# Patient Record
(demographics unavailable — no encounter records)

---

## 2024-10-16 NOTE — DISCUSSION/SUMMARY
[FreeTextEntry1] : The patient is an 86-year-old female DM, HTN, HLD, SVT, CAD, A-fib, breast cancer s/p radiation, ESRD with symptomatic A-fib during dialysis.  #1 CV- prior Cx stent, last cath 2019 mod disease, c/w isosorbide 10/10/20mg, no angina #2 AS- s/p TAVR. c/w aspirin 81mg. #3 A-Fib- c/w eliquis 2.5mg bid., start amiodarone 200mg bid x 14 days then daily and f/u w/n month. #4 HTN- c/w amlodipine 10mg and coreg 25mg bid, hydralazine 25mg AM, midday and 2 tab PM, #5 HLD- c/w rosuvastatin 40mg #6 DM- Mediterranean diet, c/w januvia #6 CAD- c/w aspirin,c/w lasix 80mg daily #7 Breast cancer- s/p radiation, no recurrence #8 CKD- f/u Dr. Tepadino, renvela, c/w Epogen, hold hydralazine and amlodipine prior to dialysis #9 Vascular- s/p AV fistula on right, JUAN A stenosis, f/u Dr. Haddad [EKG obtained to assist in diagnosis and management of assessed problem(s)] : EKG obtained to assist in diagnosis and management of assessed problem(s)

## 2024-10-16 NOTE — HISTORY OF PRESENT ILLNESS
[FreeTextEntry1] : Bridget was in CDU last Friday directly from Dialysis. Records reviewed. Seems she went into A-fib at 150 with SOB and weakness. Vagal maneuver performed and self converted. In retrospect happened before while doing laundry. At first thought secondary to K but seems she had UTI and now on Ab. Here with son and feels better.

## 2024-10-16 NOTE — CARDIOLOGY SUMMARY
[de-identified] : 10/16/24 sinus rhythm  [de-identified] : 7/15/24 NlLV fxn, valve ok, bilateral pleural effusion

## 2024-10-21 NOTE — ASSESSMENT
[FreeTextEntry1] : Assessment:  1. L CFA disease on CTA - no claudication - no rest pain 2. TAVR 3. HTN 4. HLD 5. Carotid bruits. 6. TICO with L RA stent    Plan: 1. Continue current meds 2. Surveillance carotid duplex after Summer of 2025 3. Discussed signs and symptoms of stroke. 4. R digit wound has healed 5.  Medical management of carotid disease, discussed at length with patient and son 6.  RTC in 6 months.

## 2024-10-21 NOTE — REASON FOR VISIT
[FreeTextEntry2] : PAD/ renal artery stenosis/ carotid disease [FreeTextEntry1] : 10/21/2024   She was at [facility] for a few hours for HTN She is HTN with HD Had HD earlier today  seeing Dr. Cain today  No CVA or Stroke   8/2024 Carotid duplex showed:  The peak systolic velocity measurements on the right are in the range for a severe, greater than 70% stenosis of the right internal carotid artery.  The peak systolic velocity measurements on this examination do not support the diagnosis of a flow-limiting stenosis of the left internal carotid artery.  Plan to discuss management during the next visit. Patient has preferred medical management in the past. Discussed with patient's son.  4/15  Since last visit patient reports hospitalization for COVID and HFpEF in January.  No wounds toes are pink and perfused R PT loudly audible.  Left PT audible, monophasic bilateral cartoid bruits no CVa, tia, amaurosis  10/16/2023  Admission for hypertensive urgency in July, recent admission for Afib in early August (on Eliquis 2.5 mg BID). Renal artery duplex in July showed no definite evidence of a significant renal artery stenosis. Admitted again in August to due hypertensive urgency post dialysis accompanied by weakness.  Noted in August to have R 1st and 2nd digit wound. PAD medically managed.   LE arterial duplex 8/2023: There are diffuse atherosclerotic changes.  20-49% diameter reduction is identified in the right common femoral, deep  femoral and distal superficial femoral and popliteal arteries. There are  occlusions of the right anterior tibial artery.  There is severe stenosis of the left common femoral artery. Mild to  moderate stenoses of the distal left superficial femoral and popliteal  arteries are noted. There are occlusions of the left posterior tibial  artery.  Renal Artery Duplex 8/2023: No evidence of a significant renal artery stenosis. No significant change from prior renal duplex examination.  LE venous duplex 8/2023: No evidence of left lower extremity deep venous thrombosis.  Carotid Artery Duplex 7/2023: There are moderate, 50-69% stenoses of both the right and left internal carotid arteries.  6/19/2023 Now on HD via R AVF (placed by Dr. Wong) She has not had additional carotid eval since then - had 4 admissions Dr. Irwin is nephrology no stroke, no amaurosis, no TIA.  She is on aspirin crestor 40   12/19/2022  Creatine followed by Dr. Yancey, seeing later today Had a fall She has a right AV fistula - its matured No CVA, TIA and amaurosis Breathing is ok She had a mechanical fall Nov 3rd, fx ribs and hurt L shoulder.     6/3/2022  Had renal artery duplex  R kidney 12.5cm L kidney 11.1 cm Elevated resistive index.  No comment in report about renal artery stenosis.  I reviewed the images from [facility].  the renal stent appears to be patent She has no CP or SOB She is planning on having AV fistual creation next month   5/6/2022  Seen by Dr. Desai for L ICA stenosis.  Was planned for TCAR, however no plans for TCAR as < 80% and asymptomatic.  Her creatinine in April 22, 2022 was 5.50 Will need HD access  BP is well controlled Seeing Dr. Yancey end of the month He thinks she will need HD.  Medications: *Crestor 40 *Pantoprazole *Amlodipine 10 *Lasix 80mg  daily  *Coreg 25 BID *hydralazine 50mg TID *Januvia *Aspirin 81mg daily Plavix 75 Iron     3/21/2022 Her BP at home is 140-150 Sometimes she gets 160 Breathing is ok  Has not had MRA neck yet (had moderate ICA disease, but with some shadowing artifact) She is seeing Dr. Yancey in 2 weeks No stroke, TIA, amaurosis  No chest pain.   L renal artery stent placement at [facility] R RA very complex to fix, med management  Medications: *Crestor 40 *Pantoprazole *Amlodipine 10 *Lasix 80mg  M/W/F/S *Coreg 12.5mg BID *hydralazine 50mg TID *Januvia *Aspirin 81mg daily

## 2024-10-31 NOTE — PHYSICAL EXAM
[Normal] : heart rate was normal and rhythm regular, normal S1 and S2, no murmurs [de-identified] : small hernia  just to the left of the umbilicus

## 2024-10-31 NOTE — ASSESSMENT
[FreeTextEntry1] : I ordered a CT scan of her abdomen and referred her to surgery  Office follow-up in 4 months   I spent 31 minutes with the patient and her son and answered all of her questions

## 2024-10-31 NOTE — CARDIOLOGY SUMMARY
[de-identified] : 10/31/24 sinus rhythm  [de-identified] : 7/15/24 NlLV fxn, valve ok, bilateral pleural effusion

## 2024-10-31 NOTE — DISCUSSION/SUMMARY
[FreeTextEntry1] : The patient is an 86-year-old female DM, HTN, HLD, SVT, CAD, A-fib, breast cancer s/p radiation, ESRD with umbilical hernia.  #1 CV- prior Cx stent, last cath 2019 mod disease, c/w isosorbide 10/10/20mg, no angina #2 AS- s/p TAVR. c/w aspirin 81mg. #3 A-Fib- c/w eliquis 2.5mg bid., c/w amiodarone 100mg daily #4 HTN- c/w amlodipine 10mg, hydralazine 25mg AM, midday and 2 tab PM, #5 HLD- c/w rosuvastatin 40mg #6 DM- Mediterranean diet, c/w januvia #6 CAD- c/w aspirin ,c/w furosemide 80mg daily #7 Breast cancer- s/p radiation, no recurrence #8 CKD- f/u Dr. Tepadino, renvela, c/w Epogen, hold hydralazine and amlodipine prior to dialysis #9 Vascular- s/p AV fistula on right, JUAN A stenosis, f/u Dr. Haddad [EKG obtained to assist in diagnosis and management of assessed problem(s)] : EKG obtained to assist in diagnosis and management of assessed problem(s)

## 2024-10-31 NOTE — CONSULT LETTER
[Dear  ___] : Dear ~NICKY, [Consult Letter:] : I had the pleasure of evaluating your patient, [unfilled]. [( Thank you for referring [unfilled] for consultation for _____ )] : Thank you for referring [unfilled] for consultation for [unfilled] [Please see my note below.] : Please see my note below. [Consult Closing:] : Thank you very much for allowing me to participate in the care of this patient.  If you have any questions, please do not hesitate to contact me. [Sincerely,] : Sincerely, [FreeTextEntry3] : Dwaine Joy MD  Gastroenterology [facility]

## 2024-10-31 NOTE — HISTORY OF PRESENT ILLNESS
[FreeTextEntry1] : Bridget was diagnosed with umbilical hernia that needs repair. She gets very tired and SOB easily. Dialysis gets tired and htn near end so then takes medication and comes down.

## 2024-10-31 NOTE — HISTORY OF PRESENT ILLNESS
[FreeTextEntry1] : Her heartburn is well-controlled with pantoprazole famotidine.  She now complains of umbilical pain especially at night when she  is sleeping which wakes her up.  Her son was in the room

## 2024-10-31 NOTE — CARDIOLOGY SUMMARY
[de-identified] : 10/31/24 sinus rhythm  [de-identified] : 7/15/24 NlLV fxn, valve ok, bilateral pleural effusion

## 2024-11-18 NOTE — HISTORY OF PRESENT ILLNESS
[FreeTextEntry1] : Bridget was not sure she needed this appointment but came anyway. Feeling better since last visit. No CP, dizziness or lightheadedness during dialysis.

## 2024-11-18 NOTE — DISCUSSION/SUMMARY
[EKG obtained to assist in diagnosis and management of assessed problem(s)] : EKG obtained to assist in diagnosis and management of assessed problem(s) [FreeTextEntry1] : The patient is an 86-year-old female DM, HTN, HLD, SVT, CAD, A-fib, breast cancer s/p radiation, ESRD with umbilical hernia who is otherwise feeling well. #1 CV- prior Cx stent, last cath 2019 mod disease, c/w isosorbide 10/10/20mg, no angina #2 AS- s/p TAVR. c/w aspirin 81mg. #3 A-Fib- c/w eliquis 2.5mg bid., c/w amiodarone 100mg daily #4 HTN- c/w amlodipine 10mg, hydralazine 25mg AM, midday and 2 tab PM, #5 HLD- c/w rosuvastatin 40mg #6 DM- Mediterranean diet, c/w januvia #6 CAD- c/w aspirin ,c/w furosemide 80mg daily #7 Breast cancer- s/p radiation, no recurrence #8 CKD- f/u Dr. Lacy, renvela, c/w Epogen, hold hydralazine and amlodipine prior to dialysis #9 Vascular- s/p AV fistula on right, JUAN A stenosis, f/u Dr. Haddad

## 2024-11-19 NOTE — PHYSICAL EXAM
[Normal Breath Sounds] : Normal breath sounds [Normal Rate and Rhythm] : normal rate and rhythm [No Rash or Lesion] : No rash or lesion [Alert] : alert [Calm] : calm [de-identified] : nad [de-identified] : Soft, nontender, well-healed lower midline incision.  No abdominal wall hernias [de-identified] : nl

## 2024-11-19 NOTE — HISTORY OF PRESENT ILLNESS
[de-identified] : Bridget is an 87 y/o female being seen for consultation, umbilical hernia  Patient felt pain in her umbilical area for a couple of months.   It does not make any gurgling sounds.    Regular BMs once daily or every other day.  No nausea or vomiting.  Denies fever or chills.  Patient is on aspirin and Eliquis for stentx1.

## 2024-11-19 NOTE — ASSESSMENT
[FreeTextEntry1] : In summary the patient has intermittent left-sided abdominal discomfort mainly at night.  The pain is sharp and short-lived.  She denies nausea vomiting abdominal pain or fever.  There is no abdominal wall hernia palpable or on CT performed 2 days ago.  I reassured her that there is no indication for surgery.  Her pain is likely musculoskeletal.

## 2024-11-19 NOTE — PHYSICAL EXAM
[Normal Breath Sounds] : Normal breath sounds [Normal Rate and Rhythm] : normal rate and rhythm [No Rash or Lesion] : No rash or lesion [Alert] : alert [Calm] : calm [de-identified] : nad [de-identified] : Soft, nontender, well-healed lower midline incision.  No abdominal wall hernias [de-identified] : nl

## 2024-11-19 NOTE — CONSULT LETTER
[Dear  ___] : Dear  [unfilled], [Consult Letter:] : I had the pleasure of evaluating your patient, [unfilled]. [Please see my note below.] : Please see my note below. [Consult Closing:] : Thank you very much for allowing me to participate in the care of this patient.  If you have any questions, please do not hesitate to contact me. [Sincerely,] : Sincerely, [FreeTextEntry3] : Cem Figueroa M.D., F.A.C.S, F.A.S.C.R.S [[facility]  ___] : Dr. LINDSEY

## 2024-11-19 NOTE — HISTORY OF PRESENT ILLNESS
[de-identified] : Bridget is an 87 y/o female being seen for consultation, umbilical hernia  Patient felt pain in her umbilical area for a couple of months.   It does not make any gurgling sounds.    Regular BMs once daily or every other day.  No nausea or vomiting.  Denies fever or chills.  Patient is on aspirin and Eliquis for stentx1.

## 2024-12-12 NOTE — HISTORY OF PRESENT ILLNESS
[FreeTextEntry1] : Bridget is feeling fatigued and dizzy. She does not take meds on dialysis but after comes home -200. Then takes medication. Dizziness is only today and yesterday.

## 2024-12-12 NOTE — DISCUSSION/SUMMARY
[FreeTextEntry1] : The patient is an 86-year-old female DM, HTN, HLD, SVT, CAD, A-fib, breast cancer s/p radiation, ESRD with fatigue and dizziness. #1 CV- prior Cx stent, last cath 2019 mod disease, c/w isosorbide 10/10/20mg, no angina #2 AS- s/p TAVR. c/w aspirin 81mg. #3 A-Fib- c/w Eliquis 2.5mg bid., c/w amiodarone 100mg daily #4 HTN- c/w amlodipine 10mg, hydralazine 25mg AM, midday and 2 tab PM, #5 HLD- c/w rosuvastatin 40mg #6 DM- Mediterranean diet, c/w Januvia #6 CAD- c/w aspirin, c/w furosemide 80mg daily #7 Breast cancer- s/p radiation, no recurrence #8 CKD- f/u Dr. Lacy, renvela, c/w Epogen, hold hydralazine and amlodipine prior to dialysis #9 Vascular- s/p AV fistula on right, JUAN A stenosis, f/u Dr. Haddad, no hernia just pulled muscle. [EKG obtained to assist in diagnosis and management of assessed problem(s)] : EKG obtained to assist in diagnosis and management of assessed problem(s)

## 2024-12-17 NOTE — HISTORY OF PRESENT ILLNESS
[FreeTextEntry1] : Pt is an 85 y/o female c/o right hand numbness and tingling x 6-7 months.  It wakes her from sleep even while wearing a wrist support splint.  She had 2 cortisone injections by a neurologist which both helped for a short time.  She was told that she cannot have another injection.  She was referred here for surgical consultation. Of note, patient had dialysis on right hand x 1.5 years.

## 2024-12-17 NOTE — DISCUSSION/SUMMARY
[FreeTextEntry1] : The underlying pathophysiology was reviewed with the patient. XR films were reviewed with the patient. Discussed at length the nature of the patient's condition. Her right-hand symptoms appear secondary to CTS. Discussed options for treatment including wrist brace, cortisone injection, and surgery. Risks and benefits of surgery discussed with patient. Advised patient to consult with surgical scheduler for CTR.   She should follow up post op.

## 2024-12-17 NOTE — PHYSICAL EXAM
[de-identified] : Patient is WDWN, alert, and in no acute distress. Breathing is unlabored. She is grossly oriented to person, place and time.  Right Wrist: No tenderness, edema, or deformities. No thenar atrophy. Full ROM with decreased sensation along median nerve distribution.  Test/Signs: Tinel's sing is negative over carpel tunnel, Phalen's test is positive.     [de-identified] : No imaging done today.

## 2024-12-17 NOTE — HISTORY OF PRESENT ILLNESS
[FreeTextEntry1] : Pt is an 87 y/o female c/o right hand numbness and tingling x 6-7 months.  It wakes her from sleep even while wearing a wrist support splint.  She had 2 cortisone injections by a neurologist which both helped for a short time.  She was told that she cannot have another injection.  She was referred here for surgical consultation. Of note, patient had dialysis on right hand x 1.5 years.

## 2024-12-17 NOTE — PHYSICAL EXAM
[de-identified] : Patient is WDWN, alert, and in no acute distress. Breathing is unlabored. She is grossly oriented to person, place and time.  Right Wrist: No tenderness, edema, or deformities. No thenar atrophy. Full ROM with decreased sensation along median nerve distribution.  Test/Signs: Tinel's sing is negative over carpel tunnel, Phalen's test is positive.     [de-identified] : No imaging done today.

## 2024-12-17 NOTE — PHYSICAL EXAM
[de-identified] : Patient is WDWN, alert, and in no acute distress. Breathing is unlabored. She is grossly oriented to person, place and time.  Right Wrist: No tenderness, edema, or deformities. No thenar atrophy. Full ROM with decreased sensation along median nerve distribution.  Test/Signs: Tinel's sing is negative over carpel tunnel, Phalen's test is positive.     [de-identified] : No imaging done today.

## 2024-12-17 NOTE — ADDENDUM
[FreeTextEntry1] : This note was written by Dewayne Mendoza on 12/16/2024 actively solely KYA Rob M.D.     All medical record entries made by the Scribe were at my, KYA Rob M.D. direction and personally dictated by me on 12/16/2024. I have personally reviewed the chart and agree that the record reflects my personal performance of the history, physical exam, assessment, and plan.

## 2024-12-20 NOTE — PLAN
[FreeTextEntry1] : Dizziness/ h/o Carotid occlusive disease US carotid doppler  vascular folow up  CAD/ Htn/ A.fib/ CHF / Htn EKG: Sinus major Spoke w pt cardiologist Dr. Cain> will decrease Amio to 100 mg QD decrease  Amlodipine to 100mg QD Decrease carvedilol to 6.25 mg Q pm / cont Carvedilol 12.5 mg in am  cont [id number] mg TID cont Hydralazine 25 mg morning, 25 mg noon, and 50 mg night QD-- Pt instructed to take additional 25 mg Hydrolazine if BP continues to be high cont Eliquis 2.5 mg BID cont ASA 81 mg QD cont Rosuvastatin 40 mg QD cont Furosemide 80 mg QD cardiology f/u next week   DM II Dietary counseling given, dietary avoidance discussed, diet and exercise reviewed with patient; patient reminded of importance of aerobic exercise, weight control, dietary compliance and regular glucose monitoring. Need low carb diet/ exercise. cont Januvia 25 mg QD.  GERD GI f/u cont pantoprazole 40 mg QD  ESRD on HD  nephrology folow up  Rt carpal tunnel awaiting surgery Continue sling

## 2024-12-20 NOTE — HISTORY OF PRESENT ILLNESS
[de-identified] : Ms. BHARATHI HILL is an 86-year-old female with hx. of HTN/ AS s/p TAVR/ CAD w stent/ HLD/ DM/ A.fib/breast cancers/p partial mastectomy/ RT/ ESRD on HD since 1/2023 RT arm fistula on HD M/W/ FR, dialysis, who presents for a follow up. Patient following with hand specialist for carpal tunnel and awaiting right carpal tunnel surgery. She also complaining intermittent dizziness and fatigue.  Patient has history of left carotid occlusive disease.  Last carotid Doppler more than a year ago.  She denies any chest pain, shortness of breath, abdominal pain.

## 2024-12-20 NOTE — PLAN
[FreeTextEntry1] : Dizziness/ h/o Carotid occlusive disease US carotid doppler  vascular folow up  CAD/ Htn/ A.fib/ CHF / Htn EKG: Sinus major Spoke w pt cardiologist Dr. Cain> will decrease Amio to 100 mg QD decrease  Amlodipine to 100mg QD Decrease carvedilol to 6.25 mg Q pm / cont Carvedilol 12.5 mg in am  cont [id number] mg TID cont Hydralazine 25 mg morning, 25 mg noon, and 50 mg night QD-- Pt instructed to take additional 25 mg Hydrolazine if BP continues to be high cont Eliquis 2.5 mg BID cont ASA 81 mg QD cont Rosuvastatin 40 mg QD cont Furosemide 80 mg QD cardiology f/u next week   DM II Dietary counseling given, dietary avoidance discussed, diet and exercise reviewed with patient; patient reminded of importance of aerobic exercise, weight control, dietary compliance and regular glucose monitoring. Need low carb diet/ exercise. cont Januvia 25 mg QD.  GERD GI f/u cont pantoprazole 40 mg QD  ESRD on HD  nephrology folow up  Rt carpal tunnel awaiting surgery Continue sling

## 2024-12-20 NOTE — PHYSICAL EXAM
[No Acute Distress] : no acute distress [Well Nourished] : well nourished [Well Developed] : well developed [Well-Appearing] : well-appearing [Normal Sclera/Conjunctiva] : normal sclera/conjunctiva [PERRL] : pupils equal round and reactive to light [EOMI] : extraocular movements intact [Normal Outer Ear/Nose] : the outer ears and nose were normal in appearance [Normal Oropharynx] : the oropharynx was normal [No JVD] : no jugular venous distention [No Lymphadenopathy] : no lymphadenopathy [Supple] : supple [Thyroid Normal, No Nodules] : the thyroid was normal and there were no nodules present [No Respiratory Distress] : no respiratory distress  [No Accessory Muscle Use] : no accessory muscle use [Clear to Auscultation] : lungs were clear to auscultation bilaterally [Regular Rhythm] : with a regular rhythm [Normal S1, S2] : normal S1 and S2 [No Carotid Bruits] : no carotid bruits [No Abdominal Bruit] : a ~M bruit was not heard ~T in the abdomen [No Varicosities] : no varicosities [Pedal Pulses Present] : the pedal pulses are present [No Edema] : there was no peripheral edema [No Palpable Aorta] : no palpable aorta [No Extremity Clubbing/Cyanosis] : no extremity clubbing/cyanosis [Soft] : abdomen soft [Non Tender] : non-tender [Non-distended] : non-distended [Normal Bowel Sounds] : normal bowel sounds [Normal Posterior Cervical Nodes] : no posterior cervical lymphadenopathy [Normal Anterior Cervical Nodes] : no anterior cervical lymphadenopathy [No CVA Tenderness] : no CVA  tenderness [No Spinal Tenderness] : no spinal tenderness [No Joint Swelling] : no joint swelling [Grossly Normal Strength/Tone] : grossly normal strength/tone [No Rash] : no rash [No Focal Deficits] : no focal deficits [Normal Gait] : normal gait [Normal Affect] : the affect was normal [Normal Insight/Judgement] : insight and judgment were intact [de-identified] : + bradycardia

## 2024-12-20 NOTE — HISTORY OF PRESENT ILLNESS
[de-identified] : Ms. BHARATHI HILL is an 86-year-old female with hx. of HTN/ AS s/p TAVR/ CAD w stent/ HLD/ DM/ A.fib/breast cancers/p partial mastectomy/ RT/ ESRD on HD since 1/2023 RT arm fistula on HD M/W/ FR, dialysis, who presents for a follow up. Patient following with hand specialist for carpal tunnel and awaiting right carpal tunnel surgery. She also complaining intermittent dizziness and fatigue.  Patient has history of left carotid occlusive disease.  Last carotid Doppler more than a year ago.  She denies any chest pain, shortness of breath, abdominal pain.

## 2024-12-20 NOTE — PHYSICAL EXAM
[No Acute Distress] : no acute distress [Well Nourished] : well nourished [Well Developed] : well developed [Well-Appearing] : well-appearing [Normal Sclera/Conjunctiva] : normal sclera/conjunctiva [PERRL] : pupils equal round and reactive to light [EOMI] : extraocular movements intact [Normal Outer Ear/Nose] : the outer ears and nose were normal in appearance [Normal Oropharynx] : the oropharynx was normal [No JVD] : no jugular venous distention [No Lymphadenopathy] : no lymphadenopathy [Supple] : supple [Thyroid Normal, No Nodules] : the thyroid was normal and there were no nodules present [No Respiratory Distress] : no respiratory distress  [No Accessory Muscle Use] : no accessory muscle use [Clear to Auscultation] : lungs were clear to auscultation bilaterally [Regular Rhythm] : with a regular rhythm [Normal S1, S2] : normal S1 and S2 [No Carotid Bruits] : no carotid bruits [No Abdominal Bruit] : a ~M bruit was not heard ~T in the abdomen [No Varicosities] : no varicosities [Pedal Pulses Present] : the pedal pulses are present [No Edema] : there was no peripheral edema [No Palpable Aorta] : no palpable aorta [No Extremity Clubbing/Cyanosis] : no extremity clubbing/cyanosis [Soft] : abdomen soft [Non Tender] : non-tender [Non-distended] : non-distended [Normal Bowel Sounds] : normal bowel sounds [Normal Posterior Cervical Nodes] : no posterior cervical lymphadenopathy [Normal Anterior Cervical Nodes] : no anterior cervical lymphadenopathy [No CVA Tenderness] : no CVA  tenderness [No Spinal Tenderness] : no spinal tenderness [No Joint Swelling] : no joint swelling [Grossly Normal Strength/Tone] : grossly normal strength/tone [No Rash] : no rash [No Focal Deficits] : no focal deficits [Normal Gait] : normal gait [Normal Affect] : the affect was normal [Normal Insight/Judgement] : insight and judgment were intact [de-identified] : + bradycardia

## 2025-01-12 NOTE — PHYSICAL EXAM
[No Acute Distress] : no acute distress [Well Nourished] : well nourished [Well Developed] : well developed [Well-Appearing] : well-appearing [Normal Sclera/Conjunctiva] : normal sclera/conjunctiva [PERRL] : pupils equal round and reactive to light [EOMI] : extraocular movements intact [Normal Outer Ear/Nose] : the outer ears and nose were normal in appearance [Normal TMs] : both tympanic membranes were normal [No JVD] : no jugular venous distention [No Lymphadenopathy] : no lymphadenopathy [Supple] : supple [Thyroid Normal, No Nodules] : the thyroid was normal and there were no nodules present [No Respiratory Distress] : no respiratory distress  [No Accessory Muscle Use] : no accessory muscle use [Clear to Auscultation] : lungs were clear to auscultation bilaterally [Normal Rate] : normal rate  [Regular Rhythm] : with a regular rhythm [Normal S1, S2] : normal S1 and S2 [No Carotid Bruits] : no carotid bruits [Pedal Pulses Present] : the pedal pulses are present [No Edema] : there was no peripheral edema [Soft] : abdomen soft [Non Tender] : non-tender [Non-distended] : non-distended [Normal Bowel Sounds] : normal bowel sounds [Normal Posterior Cervical Nodes] : no posterior cervical lymphadenopathy [Normal Anterior Cervical Nodes] : no anterior cervical lymphadenopathy [No CVA Tenderness] : no CVA  tenderness [No Spinal Tenderness] : no spinal tenderness [No Joint Swelling] : no joint swelling [Grossly Normal Strength/Tone] : grossly normal strength/tone [No Rash] : no rash [No Focal Deficits] : no focal deficits [Normal Gait] : normal gait [Normal Affect] : the affect was normal [Normal Insight/Judgement] : insight and judgment were intact [de-identified] : + throat erythema

## 2025-01-12 NOTE — PHYSICAL EXAM
[No Acute Distress] : no acute distress [Well Nourished] : well nourished [Well Developed] : well developed [Well-Appearing] : well-appearing [Normal Sclera/Conjunctiva] : normal sclera/conjunctiva [PERRL] : pupils equal round and reactive to light [EOMI] : extraocular movements intact [Normal Outer Ear/Nose] : the outer ears and nose were normal in appearance [Normal TMs] : both tympanic membranes were normal [No JVD] : no jugular venous distention [No Lymphadenopathy] : no lymphadenopathy [Supple] : supple [Thyroid Normal, No Nodules] : the thyroid was normal and there were no nodules present [No Respiratory Distress] : no respiratory distress  [No Accessory Muscle Use] : no accessory muscle use [Clear to Auscultation] : lungs were clear to auscultation bilaterally [Normal Rate] : normal rate  [Regular Rhythm] : with a regular rhythm [Normal S1, S2] : normal S1 and S2 [No Carotid Bruits] : no carotid bruits [Pedal Pulses Present] : the pedal pulses are present [No Edema] : there was no peripheral edema [Soft] : abdomen soft [Non Tender] : non-tender [Non-distended] : non-distended [Normal Bowel Sounds] : normal bowel sounds [Normal Posterior Cervical Nodes] : no posterior cervical lymphadenopathy [Normal Anterior Cervical Nodes] : no anterior cervical lymphadenopathy [No CVA Tenderness] : no CVA  tenderness [No Spinal Tenderness] : no spinal tenderness [No Joint Swelling] : no joint swelling [Grossly Normal Strength/Tone] : grossly normal strength/tone [No Rash] : no rash [No Focal Deficits] : no focal deficits [Normal Gait] : normal gait [Normal Affect] : the affect was normal [Normal Insight/Judgement] : insight and judgment were intact [de-identified] : + throat erythema

## 2025-01-12 NOTE — HISTORY OF PRESENT ILLNESS
[de-identified] : Ms. BHARATHI HILL is an 86-year-old female, ambulates with a cane, accompanied by her son, with hx. of HTN/ AS s/p TAVR/ CAD w stent/ HLD/ DM/ A.fib/breast cancers/p partial mastectomy/ RT/ ESRD on HD since 1/2023 RT arm fistula on HD M/W/ FR, dialysis, who presents for an acute visit.   Pt. that on Sunday, 01/05/2024, she began to feel ill. C/o HA, runny nose, sneezing, head congestion, dry cough, sore throat, eye redness/irritation and tearing. Has tried using steam to decongest herself which did help. Has been taking Tylenol, Flonase and loratadine although she hasn't noticed much improvement. Denies N/V/D, fever, or chills.

## 2025-01-12 NOTE — REVIEW OF SYSTEMS
[Earache] : no earache [Nasal Discharge] : nasal discharge [Sore Throat] : sore throat [Shortness Of Breath] : no shortness of breath [Cough] : cough [Negative] : Heme/Lymph

## 2025-01-12 NOTE — HISTORY OF PRESENT ILLNESS
[de-identified] : Ms. BHARATHI HILL is an 86-year-old female, ambulates with a cane, accompanied by her son, with hx. of HTN/ AS s/p TAVR/ CAD w stent/ HLD/ DM/ A.fib/breast cancers/p partial mastectomy/ RT/ ESRD on HD since 1/2023 RT arm fistula on HD M/W/ FR, dialysis, who presents for an acute visit.   Pt. that on Sunday, 01/05/2024, she began to feel ill. C/o HA, runny nose, sneezing, head congestion, dry cough, sore throat, eye redness/irritation and tearing. Has tried using steam to decongest herself which did help. Has been taking Tylenol, Flonase and loratadine although she hasn't noticed much improvement. Denies N/V/D, fever, or chills.

## 2025-01-12 NOTE — PLAN
[FreeTextEntry1] : see plan   CAD/ Htn/ A.fib/ CHF / Htn EKG: Sinus major Spoke w pt cardiologist Dr. Cain> will decrease Amio to 100 mg QD  carvedilol to 6.25 mg Q pm / cont Carvedilol 12.5 mg in am  cont [id number] mg TID cont Hydralazine 25 mg morning, 25 mg noon, and 50 mg night QD-- Pt instructed to take additional 25 mg Hydralazine if BP continues to be high cont Eliquis 2.5 mg BID cont ASA 81 mg QD cont Rosuvastatin 40 mg QD cont Furosemide 80 mg QD cardiology f/u next week   DM II Dietary counseling given, dietary avoidance discussed, diet and exercise reviewed with patient; patient reminded of importance of aerobic exercise, weight control, dietary compliance and regular glucose monitoring. Need low carb diet/ exercise. cont Januvia 25 mg QD.  GERD GI f/u cont pantoprazole 40 mg QD  ESRD on HD  nephrology folow up  Rt carpal tunnel awaiting surgery Continue sling

## 2025-01-12 NOTE — PLAN
[FreeTextEntry1] : see plan   CAD/ Htn/ A.fib/ CHF / Htn EKG: Sinus major Spoke w pt cardiologist Dr. Cain> will decrease Amio to 100 mg QD  carvedilol to 6.25 mg Q pm / cont Carvedilol 12.5 mg in am  cont [id number] mg TID cont Hydralazine 25 mg morning, 25 mg noon, and 50 mg night QD-- Pt instructed to take additional 25 mg Hydralazine if BP continues to be high cont Eliquis 2.5 mg BID cont ASA 81 mg QD cont Rosuvastatin 40 mg QD cont Furosemide 80 mg QD cardiology f/u next week   DM II Dietary counseling given, dietary avoidance discussed, diet and exercise reviewed with patient; patient reminded of importance of aerobic exercise, weight control, dietary compliance and regular glucose monitoring. Need low carb diet/ exercise. cont Januvia 25 mg QD.  GERD GI f/u cont pantoprazole 40 mg QD  ESRD on HD  nephrology folow up  Rt carpal tunnel awaiting surgery Continue sling

## 2025-01-27 NOTE — HISTORY OF PRESENT ILLNESS
[FreeTextEntry1] : Bridget has been having elevated BP for the last two weeks. 180-200 after dialysis. Spoke to Dr. Irwin and increase hydralazine to 25/25/50.   Surgery: right carpal tunnel release  Date: 1/31/25 Surgeon: Dr. Kj Barajas

## 2025-01-27 NOTE — DISCUSSION/SUMMARY
[FreeTextEntry1] : The patient is an 86-year-old female DM, HTN, HLD, SVT, CAD, A-fib, breast cancer s/p radiation, ESRD with elevated BP awaiting surgery. #1 CV- prior Cx stent, last cath 2019 mod disease, c/w isosorbide 10/10/20mg, no angina #2 AS- s/p TAVR. c/w aspirin 81mg. #3 A-Fib- c/w Eliquis 2.5mg bid., c/w amiodarone 100mg daily #4 HTN- c/w amlodipine 10mg, hydralazine 50mg AM, midday and 2 tab PM, #5 HLD- c/w rosuvastatin 40mg #6 DM- Mediterranean diet, c/w Januvia #6 CAD- c/w aspirin, c/w furosemide 80mg daily #7 Breast cancer- s/p radiation, no recurrence #8 CKD- f/u diana Cheryvela, c/w Epogen, hold hydralazine and amlodipine prior to dialysis #9 Vascular- s/p AV fistula on right, JUAN A stenosis, f/u Dr. Haddad There are no cardiac contraindications to carpal tunnel release off Eliquis two days before.  [EKG obtained to assist in diagnosis and management of assessed problem(s)] : EKG obtained to assist in diagnosis and management of assessed problem(s)

## 2025-01-29 NOTE — PHYSICAL EXAM
[No Acute Distress] : no acute distress [Well Nourished] : well nourished [Well Developed] : well developed [Well-Appearing] : well-appearing [Normal Sclera/Conjunctiva] : normal sclera/conjunctiva [PERRL] : pupils equal round and reactive to light [EOMI] : extraocular movements intact [Normal Outer Ear/Nose] : the outer ears and nose were normal in appearance [Normal Oropharynx] : the oropharynx was normal [Normal TMs] : both tympanic membranes were normal [No JVD] : no jugular venous distention [No Lymphadenopathy] : no lymphadenopathy [Supple] : supple [Thyroid Normal, No Nodules] : the thyroid was normal and there were no nodules present [No Respiratory Distress] : no respiratory distress  [No Accessory Muscle Use] : no accessory muscle use [Clear to Auscultation] : lungs were clear to auscultation bilaterally [Normal Rate] : normal rate  [Regular Rhythm] : with a regular rhythm [Normal S1, S2] : normal S1 and S2 [No Carotid Bruits] : no carotid bruits [Pedal Pulses Present] : the pedal pulses are present [No Edema] : there was no peripheral edema [Soft] : abdomen soft [Non Tender] : non-tender [Non-distended] : non-distended [Normal Bowel Sounds] : normal bowel sounds [Normal Posterior Cervical Nodes] : no posterior cervical lymphadenopathy [Normal Anterior Cervical Nodes] : no anterior cervical lymphadenopathy [No CVA Tenderness] : no CVA  tenderness [No Spinal Tenderness] : no spinal tenderness [No Joint Swelling] : no joint swelling [Grossly Normal Strength/Tone] : grossly normal strength/tone [No Rash] : no rash [No Focal Deficits] : no focal deficits [Normal Affect] : the affect was normal [Normal Insight/Judgement] : insight and judgment were intact [de-identified] : + ambulates with cane

## 2025-01-29 NOTE — PLAN
[FreeTextEntry1] : Ms. BHARATHI MANRIQUE is a 86 year, female, with Hx of HTN/ AS s/p TAVR/ CAD w stent/ HLD/ DM/ A.fib/breast cancers/partial mastectomy/ RT/ ESRD on HD since 1/2023 RT arm fistula on HD M/W/ FR, dialysis, medically stable for procedure.  EKG: Sinus Rhythm - First Degree AV block, 63 bpm,    ESRD on HD Follows with Nephrology   CAD/ HTN/ A.fib/ CHF  cont Amlodipine 10mg  cont Amiodarone to 100 mg QD cont Carvedilol 12.5 mg  cont Isosorbide 10 mg TID increase Hydralazine  to 50mg TID  Pt instructed to take additional 25 mg Hydralazine if BP continues to be high on  Eliquis 2.5 mg BID> Hold Eliquis 3 days prior to procedure cont ASA 81 mg QD cont Rosuvastatin 40 mg QD cont Furosemide 80 mg QD    DM II hold  Januvia 3 days prior to procedure  GERD cont pantoprazole 40 mg QD  Patient was seen and cleared by cardiology.  See cardiology clearance recommendation Labs from hemodialysis from January 29 reviewed, stable

## 2025-01-29 NOTE — HISTORY OF PRESENT ILLNESS
[Aortic Stenosis] : aortic stenosis [Atrial Fibrillation] : atrial fibrillation [Coronary Artery Disease] : coronary artery disease [No Pertinent Pulmonary History] : no history of asthma, COPD, sleep apnea, or smoking [Chronic Kidney Disease] : chronic kidney disease [Diabetes] : diabetes [Recent Myocardial Infarction] : no recent myocardial infarction [FreeTextEntry1] : RT carpal tunnel release  [FreeTextEntry2] : 1/31/25 [FreeTextEntry3] : Dr. Barajas [FreeTextEntry4] : Ms. BHARATHI HILL is a 86 year old female with Hx of HTN/ AS s/p TAVR/ CAD w stent/ HLD/ DM/ A.fib/breast cancers/partial mastectomy/ RT/ ESRD on HD since 1/2023 RT arm fistula on HD M/W/ FR, dialysis, presenting for medical clearance prior to  Carpal Tunnel Surgery on her right hand (1/31/2025). She is accompanied by her son.    Pt states she is feeling well. She received Hemodialysis today and took an extra 50mg Hydralazine for elevated BP. Her son reports her BP has been elevated for the past few days. Pt reports she was compliant with all HTN medication this morning.   Denies any SOB, CP, abdominal pain, N/V/D, headache, dizziness, or leg swelling.   Reports compliance with taking her meds daily.

## 2025-01-29 NOTE — PHYSICAL EXAM
[No Acute Distress] : no acute distress [Well Nourished] : well nourished [Well Developed] : well developed [Well-Appearing] : well-appearing [Normal Sclera/Conjunctiva] : normal sclera/conjunctiva [PERRL] : pupils equal round and reactive to light [EOMI] : extraocular movements intact [Normal Outer Ear/Nose] : the outer ears and nose were normal in appearance [Normal Oropharynx] : the oropharynx was normal [Normal TMs] : both tympanic membranes were normal [No JVD] : no jugular venous distention [No Lymphadenopathy] : no lymphadenopathy [Supple] : supple [Thyroid Normal, No Nodules] : the thyroid was normal and there were no nodules present [No Respiratory Distress] : no respiratory distress  [No Accessory Muscle Use] : no accessory muscle use [Clear to Auscultation] : lungs were clear to auscultation bilaterally [Normal Rate] : normal rate  [Regular Rhythm] : with a regular rhythm [Normal S1, S2] : normal S1 and S2 [No Carotid Bruits] : no carotid bruits [Pedal Pulses Present] : the pedal pulses are present [No Edema] : there was no peripheral edema [Soft] : abdomen soft [Non Tender] : non-tender [Non-distended] : non-distended [Normal Bowel Sounds] : normal bowel sounds [Normal Posterior Cervical Nodes] : no posterior cervical lymphadenopathy [Normal Anterior Cervical Nodes] : no anterior cervical lymphadenopathy [No CVA Tenderness] : no CVA  tenderness [No Spinal Tenderness] : no spinal tenderness [No Joint Swelling] : no joint swelling [Grossly Normal Strength/Tone] : grossly normal strength/tone [No Rash] : no rash [No Focal Deficits] : no focal deficits [Normal Affect] : the affect was normal [Normal Insight/Judgement] : insight and judgment were intact [de-identified] : + ambulates with cane

## 2025-01-29 NOTE — PHYSICAL EXAM
[No Acute Distress] : no acute distress [Well Nourished] : well nourished [Well Developed] : well developed [Well-Appearing] : well-appearing [Normal Sclera/Conjunctiva] : normal sclera/conjunctiva [PERRL] : pupils equal round and reactive to light [EOMI] : extraocular movements intact [Normal Outer Ear/Nose] : the outer ears and nose were normal in appearance [Normal Oropharynx] : the oropharynx was normal [Normal TMs] : both tympanic membranes were normal [No JVD] : no jugular venous distention [No Lymphadenopathy] : no lymphadenopathy [Supple] : supple [Thyroid Normal, No Nodules] : the thyroid was normal and there were no nodules present [No Respiratory Distress] : no respiratory distress  [No Accessory Muscle Use] : no accessory muscle use [Clear to Auscultation] : lungs were clear to auscultation bilaterally [Normal Rate] : normal rate  [Regular Rhythm] : with a regular rhythm [Normal S1, S2] : normal S1 and S2 [No Carotid Bruits] : no carotid bruits [Pedal Pulses Present] : the pedal pulses are present [No Edema] : there was no peripheral edema [Soft] : abdomen soft [Non Tender] : non-tender [Non-distended] : non-distended [Normal Bowel Sounds] : normal bowel sounds [Normal Posterior Cervical Nodes] : no posterior cervical lymphadenopathy [Normal Anterior Cervical Nodes] : no anterior cervical lymphadenopathy [No CVA Tenderness] : no CVA  tenderness [No Spinal Tenderness] : no spinal tenderness [No Joint Swelling] : no joint swelling [Grossly Normal Strength/Tone] : grossly normal strength/tone [No Rash] : no rash [No Focal Deficits] : no focal deficits [Normal Affect] : the affect was normal [Normal Insight/Judgement] : insight and judgment were intact [de-identified] : + ambulates with cane

## 2025-01-29 NOTE — ADDENDUM
[FreeTextEntry1] : Documented by Radha Campoverde acting as a scribe for Dr. Stephen. 01/27/2025   All medical record entries made by the scribe were at my, Dr. Stephen, direction and personally dictated by me on 01/27/2025. I have reviewed the chart an agree that the record accurately reflects my personal performance of the history, physical exam, assessment and plan. I have also personally directed, reviewed, and agreed with the chart.

## 2025-02-06 NOTE — DISCUSSION/SUMMARY
[FreeTextEntry1] : The patient is an 86-year-old female DM, HTN, HLD, SVT, CAD, A-fib, breast cancer s/p radiation, ESRD with elevated BP #1 CV- prior Cx stent, last cath 2019 mod disease, c/w isosorbide 10/10/20mg, no angina #2 AS- s/p TAVR. c/w aspirin 81mg, ECHO ordered #3 A-Fib- c/w Eliquis 2.5mg bid., c/w amiodarone 100mg daily #4 HTN- c/w amlodipine 10mg, hydralazine 50mg now 3x day #5 HLD- c/w rosuvastatin 40mg #6 DM- Mediterranean diet, c/w Januvia #6 CAD- c/w aspirin, c/w furosemide 80mg daily #7 Breast cancer- s/p radiation, no recurrence #8 CKD- f/u Dr. Tepadino, Renvela, c/w Epogen, not holding hydralazine and amlodipine prior to dialysis #9 Vascular- s/p AV fistula on right, JUAN A stenosis, f/u Dr. Haddad #10 Ortho- s/p carpal tunnel release

## 2025-02-06 NOTE — HISTORY OF PRESENT ILLNESS
[FreeTextEntry1] : Bridget had her carpal tunnel surgery but now with soreness. BP still high and now taking medication before dialysis.

## 2025-02-10 NOTE — ADDENDUM
[FreeTextEntry1] : I, Ayala Han, wrote this note acting as a scribe for Dr. Kj Barajas M.D. on 02/10/2025.   All medical record entries made by the Scribe were at my, Dr. Kj Barajas M.D., direction and personally dictated by me on 02/10/2025. I have personally reviewed the chart and agree that the record accurately reflects my personal performance of the history, physical exam, assessment, and plan.

## 2025-02-10 NOTE — PHYSICAL EXAM
[de-identified] : Patient is WDWN, alert, and in no acute distress. Breathing is unlabored. She is grossly oriented to person, place, and time.   Right Wrist:  incision is healing well. There are no signs of infection.  Sutures were removed. Normal amount of post-operative edema and tenderness present.  [de-identified] : No imaging done today.

## 2025-02-10 NOTE — HISTORY OF PRESENT ILLNESS
[FreeTextEntry1] : Pt is an 87 y/o female who presents today for a 1st post-operative visit s/p right carpal tunnel release. DOS: 01/31/25. Patient reports some symptomatic improvement in her numbness and nighttime symptoms post-operatively. Postoperative pain is well controlled. She denies any fevers, chills, or night sweats. Fingers are moving well.

## 2025-02-10 NOTE — DISCUSSION/SUMMARY
[FreeTextEntry1] : The underlying pathophysiology was reviewed with the patient. Discussed at length the nature of the patients condition.   Her sutures were removed in office today, which was well-tolerated by the patient. Patient was instructed on local wound care and was advised to keep her incisional site dry until this upcoming weekend. She will perform active digit range of motion as instructed. Re-counseled the patient to anticipate maximal nerve recovery until 1 year post-op.   All questions answered, understanding verbalized. Patient in agreement with plan of care. The patient can follow-up in 6 weeks. If the patient begins to experience any changes or severe exacerbation of her symptoms, she should reach out to me as soon as possible.

## 2025-02-12 NOTE — ASSESSMENT
[FreeTextEntry1] : She most likely has air swallowing from eating too quickly as well as increased gas from carbonated beverages  Continue pantoprazole and famotidine Eat slowly and close your mouth when chewing Avoid carbonated beverages Start IB ARABELLA 1 to 2 tablets with meals 3x/ day  Office follow up in 2 to 3 weeks.   I spent 24 minutes with the patient and her son and answered all of heir questions

## 2025-02-12 NOTE — HISTORY OF PRESENT ILLNESS
[FreeTextEntry1] : She had been doing well with pantoprazole 40 mg in the morning and famotidine 40 mg in the evening but for the last 2 days, she has a feeling that she can not digest her food and has excessive burping. She denies a change  in her diet.  She denies NSAID use. She admits to eating fast and does take carbonated beverages.    Her son was in the room

## 2025-02-12 NOTE — CONSULT LETTER
[Dear  ___] : Dear  [unfilled], [Consult Letter:] : I had the pleasure of evaluating your patient, [unfilled]. [( Thank you for referring [unfilled] for consultation for _____ )] : Thank you for referring [unfilled] for consultation for [unfilled] [Please see my note below.] : Please see my note below. [Consult Closing:] : Thank you very much for allowing me to participate in the care of this patient.  If you have any questions, please do not hesitate to contact me. [Sincerely,] : Sincerely, [FreeTextEntry3] : Dwaine Joy MD  Gastroenterology [facility]

## 2025-02-14 NOTE — ADDENDUM
[FreeTextEntry1] : Documented by Radha Campoverde acting as a scribe for Dr. Stephen. 02/06/2025   All medical record entries made by the scribe were at my, Dr. Stephen, direction and personally dictated by me on 02/06/2025. I have reviewed the chart an agree that the record accurately reflects my personal performance of the history, physical exam, assessment and plan. I have also personally directed, reviewed, and agreed with the chart.

## 2025-02-14 NOTE — PHYSICAL EXAM
[No Acute Distress] : no acute distress [Well Nourished] : well nourished [Well Developed] : well developed [Well-Appearing] : well-appearing [Normal Sclera/Conjunctiva] : normal sclera/conjunctiva [Normal Outer Ear/Nose] : the outer ears and nose were normal in appearance [Normal Oropharynx] : the oropharynx was normal [No JVD] : no jugular venous distention [No Lymphadenopathy] : no lymphadenopathy [Supple] : supple [Thyroid Normal, No Nodules] : the thyroid was normal and there were no nodules present [No Respiratory Distress] : no respiratory distress  [No Accessory Muscle Use] : no accessory muscle use [Clear to Auscultation] : lungs were clear to auscultation bilaterally [Normal Rate] : normal rate  [Regular Rhythm] : with a regular rhythm [Normal S1, S2] : normal S1 and S2 [Pedal Pulses Present] : the pedal pulses are present [No Edema] : there was no peripheral edema [No Extremity Clubbing/Cyanosis] : no extremity clubbing/cyanosis [Soft] : abdomen soft [Non Tender] : non-tender [Non-distended] : non-distended [Normal Posterior Cervical Nodes] : no posterior cervical lymphadenopathy [Normal Anterior Cervical Nodes] : no anterior cervical lymphadenopathy [No CVA Tenderness] : no CVA  tenderness [No Spinal Tenderness] : no spinal tenderness [No Joint Swelling] : no joint swelling [Grossly Normal Strength/Tone] : grossly normal strength/tone [No Rash] : no rash [Coordination Grossly Intact] : coordination grossly intact [No Focal Deficits] : no focal deficits [Normal Gait] : normal gait [Normal Affect] : the affect was normal [Normal Insight/Judgement] : insight and judgment were intact [de-identified] : +bruising on rt phalanges  [de-identified] : + MAT 3/6 RSB [de-identified] : RT wrist in splint

## 2025-02-14 NOTE — PLAN
[FreeTextEntry1] : Follow up   AS Referred to cardiology  repeat Echo  CAD/ Htn/ A.fib/ CHF / Htn cont Amio 100 mg QD cont Carvedilol 12.5 mg in am cont [id number] mg TID cont Hydralazine 50mg TID, take additional 25mg or 50mg if BP is elevated  cont Eliquis 2.5 mg BID cont ASA 81 mg QD cont Rosuvastatin 40 mg QD cont Furosemide 80 mg QD follow with cardiology   DM II Dietary counseling given, dietary avoidance discussed, diet and exercise reviewed with patient; patient reminded of importance of aerobic exercise, weight control, dietary compliance and regular glucose monitoring. Need low carb diet/ exercise. cont Januvia 25 mg QD.  GERD GI f/u cont pantoprazole 40 mg QD  ESRD on HD nephrology follow up  s/p Rt carpal tunnel following with Ortho   Follow up in 1 month

## 2025-02-14 NOTE — HISTORY OF PRESENT ILLNESS
[de-identified] : Ms. BHARATHI HILL is a 86 year old female with Hx of HTN/ AS s/p TAVR/ CAD w stent/ HLD/ DM/ A.fib/breast cancers/partial mastectomy/ RT/ ESRD on HD since 1/2023 RT arm fistula on HD M/W/ FR, dialysis, s/p carpal tunnel surgery on rt hand (1/31/25), presenting for a post-operative follow up. She is accompanied by her son. Pt is s/p carpal tunnel surgery on her rt hand and states she is feeling well. She reports she will remain in sling until next Thursday and is continuing dialysis. She reports taking her HTN medications in the mornings including hydralazine 50mg TID. She is scheduled for a US carotid on Tuesday 2/11 and has not recently had Echo. She reports her aortic valve procedure was many years ago.  Denies any SOB, CP, abdominal pain, N/V/D, headache, dizziness, or leg swelling. Reports compliance with taking her meds daily.

## 2025-02-14 NOTE — PLAN
[FreeTextEntry1] : Follow up   AS Referred to cardiology  repeat Echo  CAD/ Htn/ A.fib/ CHF / Htn cont Amio 100 mg QD cont Carvedilol 12.5 mg in am cont [id number] mg TID cont Hydralazine 50mg TID, take additional 25mg or 50mg if BP is elevated  cont Eliquis 2.5 mg BID cont ASA 81 mg QD cont Rosuvastatin 40 mg QD cont Furosemide 80 mg QD follow with cardiology   DM II Dietary counseling given, dietary avoidance discussed, diet and exercise reviewed with patient; patient reminded of importance of aerobic exercise, weight control, dietary compliance and regular glucose monitoring. Need low carb diet/ exercise. cont Januvia 25 mg QD.  GERD GI f/u cont pantoprazole 40 mg QD  ESRD on HD nephrology follow up  s/p Rt carpal tunnel following with Ortho   Follow up in 1 month

## 2025-02-14 NOTE — PHYSICAL EXAM
[No Acute Distress] : no acute distress [Well Nourished] : well nourished [Well Developed] : well developed [Well-Appearing] : well-appearing [Normal Sclera/Conjunctiva] : normal sclera/conjunctiva [Normal Outer Ear/Nose] : the outer ears and nose were normal in appearance [Normal Oropharynx] : the oropharynx was normal [No JVD] : no jugular venous distention [No Lymphadenopathy] : no lymphadenopathy [Supple] : supple [Thyroid Normal, No Nodules] : the thyroid was normal and there were no nodules present [No Respiratory Distress] : no respiratory distress  [No Accessory Muscle Use] : no accessory muscle use [Clear to Auscultation] : lungs were clear to auscultation bilaterally [Normal Rate] : normal rate  [Regular Rhythm] : with a regular rhythm [Normal S1, S2] : normal S1 and S2 [Pedal Pulses Present] : the pedal pulses are present [No Edema] : there was no peripheral edema [No Extremity Clubbing/Cyanosis] : no extremity clubbing/cyanosis [Soft] : abdomen soft [Non Tender] : non-tender [Non-distended] : non-distended [Normal Posterior Cervical Nodes] : no posterior cervical lymphadenopathy [Normal Anterior Cervical Nodes] : no anterior cervical lymphadenopathy [No CVA Tenderness] : no CVA  tenderness [No Spinal Tenderness] : no spinal tenderness [No Joint Swelling] : no joint swelling [Grossly Normal Strength/Tone] : grossly normal strength/tone [No Rash] : no rash [Coordination Grossly Intact] : coordination grossly intact [No Focal Deficits] : no focal deficits [Normal Gait] : normal gait [Normal Affect] : the affect was normal [Normal Insight/Judgement] : insight and judgment were intact [de-identified] : +bruising on rt phalanges  [de-identified] : + MAT 3/6 RSB [de-identified] : RT wrist in splint

## 2025-02-14 NOTE — HISTORY OF PRESENT ILLNESS
[de-identified] : Ms. BHARATHI HILL is a 86 year old female with Hx of HTN/ AS s/p TAVR/ CAD w stent/ HLD/ DM/ A.fib/breast cancers/partial mastectomy/ RT/ ESRD on HD since 1/2023 RT arm fistula on HD M/W/ FR, dialysis, s/p carpal tunnel surgery on rt hand (1/31/25), presenting for a post-operative follow up. She is accompanied by her son. Pt is s/p carpal tunnel surgery on her rt hand and states she is feeling well. She reports she will remain in sling until next Thursday and is continuing dialysis. She reports taking her HTN medications in the mornings including hydralazine 50mg TID. She is scheduled for a US carotid on Tuesday 2/11 and has not recently had Echo. She reports her aortic valve procedure was many years ago.  Denies any SOB, CP, abdominal pain, N/V/D, headache, dizziness, or leg swelling. Reports compliance with taking her meds daily.

## 2025-02-18 NOTE — PHYSICAL EXAM
[No Acute Distress] : no acute distress [Well Nourished] : well nourished [Well Developed] : well developed [Well-Appearing] : well-appearing [Normal Sclera/Conjunctiva] : normal sclera/conjunctiva [Normal Outer Ear/Nose] : the outer ears and nose were normal in appearance [Normal Oropharynx] : the oropharynx was normal [No JVD] : no jugular venous distention [No Lymphadenopathy] : no lymphadenopathy [Supple] : supple [Thyroid Normal, No Nodules] : the thyroid was normal and there were no nodules present [No Respiratory Distress] : no respiratory distress  [No Accessory Muscle Use] : no accessory muscle use [Clear to Auscultation] : lungs were clear to auscultation bilaterally [Normal Rate] : normal rate  [Regular Rhythm] : with a regular rhythm [Normal S1, S2] : normal S1 and S2 [Pedal Pulses Present] : the pedal pulses are present [No Edema] : there was no peripheral edema [No Extremity Clubbing/Cyanosis] : no extremity clubbing/cyanosis [Soft] : abdomen soft [Non Tender] : non-tender [Non-distended] : non-distended [Normal Posterior Cervical Nodes] : no posterior cervical lymphadenopathy [Normal Anterior Cervical Nodes] : no anterior cervical lymphadenopathy [No CVA Tenderness] : no CVA  tenderness [No Spinal Tenderness] : no spinal tenderness [No Joint Swelling] : no joint swelling [Grossly Normal Strength/Tone] : grossly normal strength/tone [No Rash] : no rash [Coordination Grossly Intact] : coordination grossly intact [No Focal Deficits] : no focal deficits [Normal Gait] : normal gait [Normal Affect] : the affect was normal [Normal Insight/Judgement] : insight and judgment were intact

## 2025-03-06 NOTE — PLAN
[FreeTextEntry1] : Follow up  CAD/ Htn/ A.fib/ CHF / Htn cont. Telmisartan 40mg QD cont Amio 100 mg QD cont Carvedilol 12.5 mg BID cont [id number] mg TID cont Hydralazine 50mg TID, take additional 25mg or 50mg if BP is elevated  cont Eliquis 2.5 mg BID cont ASA 81 mg QD cont Rosuvastatin 40 mg QD cont Furosemide 80 mg QD follow with cardiology  DM II Dietary counseling given, dietary avoidance discussed, diet and exercise reviewed with patient; patient reminded of importance of aerobic exercise, weight control, dietary compliance and regular glucose monitoring. Need low carb diet/ exercise. cont Januvia 25 mg QD.  GERD GI f/u cont pantoprazole 40 mg QD  ESRD on HD nephrology follow up  s/p Rt carpal tunnel surgery  pt clinically improved  following with Ortho

## 2025-03-06 NOTE — ADDENDUM
[FreeTextEntry1] : Documented by Radha Campoverde acting as a scribe for Dr. Stephen. 03/06/2025   All medical record entries made by the scribe were at my, Dr. Stephen, direction and personally dictated by me on 03/06/2025. I have reviewed the chart an agree that the record accurately reflects my personal performance of the history, physical exam, assessment and plan. I have also personally directed, reviewed, and agreed with the chart.

## 2025-03-06 NOTE — HISTORY OF PRESENT ILLNESS
[Family Member] : family member [de-identified] : Ms. BHARATHI HILL is a 86 year old female with Hx of HTN/ AS s/p TAVR/ CAD w stent/ HLD/ DM/ A.fib/breast cancers/partial mastectomy/ RT/ ESRD on HD since 1/2023 RT arm fistula on HD M/W/ FR, dialysis, s/p carpal tunnel surgery on rt hand (1/31/25), presenting for a follow up on chronic problems.   She went for dialysis and bloodwork yesterday and is feeling well. Pt reports she was started on Telmisartan 2 weeks ago for HTN and her BP has improved.  She reports loss of appetite and her diet is not high in protein, but she eats small meals throughout the day. She exercises at home on bicycle daily 10-15 min   Pt reports she had US carotid doppler Tuesday and is awaiting results.  Denies any SOB, CP, abdominal pain, N/V/D, headache, dizziness, or leg swelling. Reports compliance with taking her meds daily.

## 2025-03-06 NOTE — HISTORY OF PRESENT ILLNESS
[Family Member] : family member [de-identified] : Ms. BHARATHI HILL is a 86 year old female with Hx of HTN/ AS s/p TAVR/ CAD w stent/ HLD/ DM/ A.fib/breast cancers/partial mastectomy/ RT/ ESRD on HD since 1/2023 RT arm fistula on HD M/W/ FR, dialysis, s/p carpal tunnel surgery on rt hand (1/31/25), presenting for a follow up on chronic problems.   She went for dialysis and bloodwork yesterday and is feeling well. Pt reports she was started on Telmisartan 2 weeks ago for HTN and her BP has improved.  She reports loss of appetite and her diet is not high in protein, but she eats small meals throughout the day. She exercises at home on bicycle daily 10-15 min   Pt reports she had US carotid doppler Tuesday and is awaiting results.  Denies any SOB, CP, abdominal pain, N/V/D, headache, dizziness, or leg swelling. Reports compliance with taking her meds daily.

## 2025-03-27 NOTE — HISTORY OF PRESENT ILLNESS
[FreeTextEntry1] : 86-year-old female DM, HTN, HLD, SVT, CAD, A-fib, breast cancer s/p radiation, ESRD s/p hospitalization for fluid overload last week. Had UFT M,T,W and resumed normal dialysis. Meds unchanged. Very weak and fatigued.

## 2025-03-27 NOTE — DISCUSSION/SUMMARY
[FreeTextEntry1] : The patient is an 86-year-old female DM, HTN, HLD, SVT, CAD, A-fib, breast cancer s/p radiation, ESRD with low diastolic BP.  #1 CV- prior Cx stent, last cath 2019 mod disease, c/w isosorbide 10/10/20mg, no angina #2 AS- s/p TAVR. c/w aspirin 81mg, ECHO severe LVH and Grade III diastolic dysfxn, TAVR #3 A-Fib- c/w Eliquis 2.5mg bid., c/w amiodarone 100mg daily #4 HTN- c/w amlodipine 10mg, hydralazine 50mg now 3x day #5 HLD- c/w rosuvastatin 40mg #6 DM- Mediterranean diet, c/w Januvia #6 CAD- c/w aspirin, c/w furosemide 80mg daily #7 Breast cancer- s/p radiation, no recurrence #8 CKD- f/u Dr. Tepadino, Renvela, c/w Epogen, not holding hydralazine and amlodipine prior to dialysis #9 Vascular- s/p AV fistula on right, JUAN A stenosis, f/u Dr. Haddad #10 Ortho- s/p carpal tunnel release  [EKG obtained to assist in diagnosis and management of assessed problem(s)] : EKG obtained to assist in diagnosis and management of assessed problem(s)

## 2025-04-14 NOTE — ADDENDUM
[FreeTextEntry1] : Documented by Radha Campoverde acting as a scribe for Dr. Stephen. 04/08/2025   All medical record entries made by the scribe were at my, Dr. Stephen, direction and personally dictated by me on 04/08/2025. I have reviewed the chart an agree that the record accurately reflects my personal performance of the history, physical exam, assessment and plan. I have also personally directed, reviewed, and agreed with the chart.

## 2025-04-14 NOTE — HISTORY OF PRESENT ILLNESS
[de-identified] : Ms. BHARATHI HILL is a 86 year old female with Hx of HTN/ AS s/p TAVR/ CAD w stent/ HLD/ DM/ A.fib/breast cancers/partial mastectomy/ RT/ ESRD on HD since 1/2023 RT arm fistula on HD M/W/ FR, dialysis, s/p carpal tunnel surgery on rt hand (1/31/25), presenting for a follow up on chronic problems.   Pt states she is feeling well and reports her BP has improved since restarting losartan.  Pt reports her dialysis treatment was adjusted d/t feeling weak (lowered to 2 liters from 2.2 liters).  She recently had an echo and US within the past month and is following with Vascular and cardiology.  Denies any SOB, CP, abdominal pain, and reports compliance with taking her meds daily.

## 2025-04-14 NOTE — HISTORY OF PRESENT ILLNESS
[de-identified] : Ms. BHARATHI HILL is a 86 year old female with Hx of HTN/ AS s/p TAVR/ CAD w stent/ HLD/ DM/ A.fib/breast cancers/partial mastectomy/ RT/ ESRD on HD since 1/2023 RT arm fistula on HD M/W/ FR, dialysis, s/p carpal tunnel surgery on rt hand (1/31/25), presenting for a follow up on chronic problems.   Pt states she is feeling well and reports her BP has improved since restarting losartan.  Pt reports her dialysis treatment was adjusted d/t feeling weak (lowered to 2 liters from 2.2 liters).  She recently had an echo and US within the past month and is following with Vascular and cardiology.  Denies any SOB, CP, abdominal pain, and reports compliance with taking her meds daily.

## 2025-04-14 NOTE — PLAN
[FreeTextEntry1] : Follow up   CAD/ Htn/ A.fib/ CHF / Htn cont. Telmisartan 40mg QD cont Amio 100 mg QD cont Carvedilol 12.5 mg BID cont [id number] mg TID cont Hydralazine 50mg TID, take additional 25mg or 50mg if BP is elevated cont Eliquis 2.5 mg BID cont ASA 81 mg QD cont Rosuvastatin 40 mg QD cont Furosemide 80 mg QD follow with cardiology  DM II Dietary counseling given, dietary avoidance discussed, diet and exercise reviewed with patient; patient reminded of importance of aerobic exercise, weight control, dietary compliance and regular glucose monitoring. Need low carb diet/ exercise. cont Januvia 25 mg QD.  GERD GI f/u cont pantoprazole 40 mg QD  ESRD on HD nephrology follow up

## 2025-04-14 NOTE — PLAN
[FreeTextEntry1] : Follow up   CAD/ Htn/ A.fib/ CHF / Htn cont. Telmisartan 40mg QD cont Amio 100 mg QD cont Carvedilol 12.5 mg BID cont [id number] mg TID cont Hydralazine 50mg TID, take additional 25mg or 50mg if BP is elevated cont Eliquis 2.5 mg BID cont ASA 81 mg QD cont Rosuvastatin 40 mg QD cont Furosemide 80 mg QD follow with cardiology  DM II Dietary counseling given, dietary avoidance discussed, diet and exercise reviewed with patient; patient reminded of importance of aerobic exercise, weight control, dietary compliance and regular glucose monitoring. Need low carb diet/ exercise. cont Januvia 25 mg QD.  GERD GI f/u cont pantoprazole 40 mg QD  ESRD on HD nephrology follow up

## 2025-04-21 NOTE — REASON FOR VISIT
[Follow-Up - Clinic] : a clinic follow-up of [FreeTextEntry2] : PAD/ renal artery stenosis/ carotid disease [FreeTextEntry1] : 4/21/25 no amaurosis, cva, tia getting HD m/w/f here with son   3/2025 US Duplex Carotid showed: 1. Right ICA severe stenosis >70% 2. Left ICA severe stenosis >70%  Patient prefers medical management as discussed with patient's son. Risk and benefits explained prior.  10/21/2024   She was at [facility] for a few hours for HTN She is HTN with HD Had HD earlier today  seeing Dr. Cain today  No CVA or Stroke   8/2024 Carotid duplex showed:  The peak systolic velocity measurements on the right are in the range for a severe, greater than 70% stenosis of the right internal carotid artery.  The peak systolic velocity measurements on this examination do not support the diagnosis of a flow-limiting stenosis of the left internal carotid artery.  Plan to discuss management during the next visit. Patient has preferred medical management in the past. Discussed with patient's son.  4/15  Since last visit patient reports hospitalization for COVID and HFpEF in January.  No wounds toes are pink and perfused R PT loudly audible.  Left PT audible, monophasic bilateral cartoid bruits no CVa, tia, amaurosis  10/16/2023  Admission for hypertensive urgency in July, recent admission for Afib in early August (on Eliquis 2.5 mg BID). Renal artery duplex in July showed no definite evidence of a significant renal artery stenosis. Admitted again in August to due hypertensive urgency post dialysis accompanied by weakness.  Noted in August to have R 1st and 2nd digit wound. PAD medically managed.   LE arterial duplex 8/2023: There are diffuse atherosclerotic changes.  20-49% diameter reduction is identified in the right common femoral, deep  femoral and distal superficial femoral and popliteal arteries. There are  occlusions of the right anterior tibial artery.  There is severe stenosis of the left common femoral artery. Mild to  moderate stenoses of the distal left superficial femoral and popliteal  arteries are noted. There are occlusions of the left posterior tibial  artery.  Renal Artery Duplex 8/2023: No evidence of a significant renal artery stenosis. No significant change from prior renal duplex examination.  LE venous duplex 8/2023: No evidence of left lower extremity deep venous thrombosis.  Carotid Artery Duplex 7/2023: There are moderate, 50-69% stenoses of both the right and left internal carotid arteries.  6/19/2023 Now on HD via R AVF (placed by Dr. Wong) She has not had additional carotid eval since then - had 4 admissions Dr. Irwin is nephrology no stroke, no amaurosis, no TIA.  She is on aspirin crestor 40   12/19/2022  Creatine followed by Dr. Yancey, seeing later today Had a fall She has a right AV fistula - its matured No CVA, TIA and amaurosis Breathing is ok She had a mechanical fall Nov 3rd, fx ribs and hurt L shoulder.     6/3/2022  Had renal artery duplex  R kidney 12.5cm L kidney 11.1 cm Elevated resistive index.  No comment in report about renal artery stenosis.  I reviewed the images from [facility].  the renal stent appears to be patent She has no CP or SOB She is planning on having AV fistual creation next month   5/6/2022  Seen by Dr. Desai for L ICA stenosis.  Was planned for TCAR, however no plans for TCAR as < 80% and asymptomatic.  Her creatinine in April 22, 2022 was 5.50 Will need HD access  BP is well controlled Seeing Dr. Yancey end of the month He thinks she will need HD.  Medications: *Crestor 40 *Pantoprazole *Amlodipine 10 *Lasix 80mg  daily  *Coreg 25 BID *hydralazine 50mg TID *Januvia *Aspirin 81mg daily Plavix 75 Iron     3/21/2022 Her BP at home is 140-150 Sometimes she gets 160 Breathing is ok  Has not had MRA neck yet (had moderate ICA disease, but with some shadowing artifact) She is seeing Dr. Yancey in 2 weeks No stroke, TIA, amaurosis  No chest pain.   L renal artery stent placement at [facility] R RA very complex to fix, med management  Medications: *Crestor 40 *Pantoprazole *Amlodipine 10 *Lasix 80mg  M/W/F/S *Coreg 12.5mg BID *hydralazine 50mg TID *Januvia *Aspirin 81mg daily

## 2025-04-21 NOTE — ASSESSMENT
[FreeTextEntry1] : Assessment:  1. L CFA disease on CTA - no claudication - no rest pain 2. TAVR 3. HTN 4. HLD 5. Carotid bruits. 6. TICO with L RA stent    Plan: 1. Continue current meds 2. Surveillance carotid duplex in 1 year  3. Discussed signs and symptoms of stroke. 4. R digit wound has healed 5.  Medical management of carotid disease, discussed at length with patient and son 6.   Offered to have her see Dr. Desai again, however at 86 years of age, ESRD, comorbid conditions , perhaps medical management is the best option RTC in 1 year

## 2025-04-21 NOTE — PHYSICAL EXAM
[General Appearance - Well Developed] : well developed [Normal Appearance] : normal appearance [Well Groomed] : well groomed [General Appearance - Well Nourished] : well nourished [No Deformities] : no deformities [General Appearance - In No Acute Distress] : no acute distress [Normal Conjunctiva] : the conjunctiva exhibited no abnormalities [Eyelids - No Xanthelasma] : the eyelids demonstrated no xanthelasmas [Normal Oral Mucosa] : normal oral mucosa [No Oral Pallor] : no oral pallor [No Oral Cyanosis] : no oral cyanosis [Normal Jugular Venous A Waves Present] : normal jugular venous A waves present [Normal Jugular Venous V Waves Present] : normal jugular venous V waves present [No Jugular Venous Reis A Waves] : no jugular venous reis A waves [Respiration, Rhythm And Depth] : normal respiratory rhythm and effort [Exaggerated Use Of Accessory Muscles For Inspiration] : no accessory muscle use [Auscultation Breath Sounds / Voice Sounds] : lungs were clear to auscultation bilaterally [Heart Rate And Rhythm] : heart rate and rhythm were normal [Heart Sounds] : normal S1 and S2 [Murmurs] : no murmurs present [Abdomen Soft] : soft [Abdomen Tenderness] : non-tender [Abdomen Mass (___ Cm)] : no abdominal mass palpated [Abnormal Walk] : normal gait [Gait - Sufficient For Exercise Testing] : the gait was sufficient for exercise testing [Skin Color & Pigmentation] : normal skin color and pigmentation [] : no rash [No Venous Stasis] : no venous stasis [Skin Lesions] : no skin lesions [No Skin Ulcers] : no skin ulcer [No Xanthoma] : no  xanthoma was observed [Oriented To Time, Place, And Person] : oriented to person, place, and time [Affect] : the affect was normal [Mood] : the mood was normal [No Anxiety] : not feeling anxious [FreeTextEntry1] : feet are warm.  Audible DP and PT.  no ulceration.

## 2025-04-24 NOTE — HISTORY OF PRESENT ILLNESS
[FreeTextEntry1] : 2/11/25: She had been doing well with pantoprazole 40 mg in the morning and famotidine 40 mg in the evening but for the last 2 days, she has a feeling that she can not digest her food and has excessive burping. She denies a change  in her diet.  She denies NSAID use. She admits to eating fast and does take carbonated beverages.  Her son was in the room  RTO 4/24/25 for diarrhea that started Sunday night 4/20/25. Gilberto Castillo noted at bedside. Patient denies any cp, fevers, abdominal pain or rectal bleeding. Occasional sob, recently followed up with Cardiologist. Patient feels fatigued, 2-3 liquid BMs/day. Complains of nausea during the night. Will order stool tests and follow up results. Patient is aware to maintain hydration and monitor symptoms.   Gilberto Castillo: [phone]

## 2025-04-24 NOTE — ASSESSMENT
[FreeTextEntry1] : 2/11/25: She had been doing well with pantoprazole 40 mg in the morning and famotidine 40 mg in the evening but for the last 2 days, she has a feeling that she can not digest her food and has excessive burping. She denies a change  in her diet.  She denies NSAID use. She admits to eating fast and does take carbonated beverages.  Her son was in the room  RTO 4/24/25 for diarrhea that started Sunday night 4/20/25. Gilberto Castillo noted at bedside. Patient denies any cp, fevers, abdominal pain or rectal bleeding. Occasional sob, recently followed up with Cardiologist. Patient feels fatigued, 2-3 liquid BMs/day. Complains of nausea during the night. Will order stool tests and follow up results. Patient is aware to maintain hydration and monitor symptoms.   Gilberto Castillo: [phone]   Plan: 1. Ordered stool tests 2. Will follow up results with NP

## 2025-05-13 NOTE — REASON FOR VISIT
[Symptom and Test Evaluation] : symptom and test evaluation [Arrhythmia/ECG Abnorrmalities] : arrhythmia/ECG abnormalities [Hypertension] : hypertension [Family Member] : family member

## 2025-05-16 NOTE — DISCUSSION/SUMMARY
[FreeTextEntry1] : The patient is an 86-year-old female DM, HTN, HLD, SVT, CAD, A-fib, breast cancer s/p radiation, ESRD with fatigue #1 CV- prior Cx stent, last cath 2019 mod disease, c/w isosorbide 10/10/20mg, no angina #2 AS- s/p TAVR. c/w aspirin 81mg, ECHO severe LVH and Grade III diastolic dysfxn, TAVR #3 A-Fib- c/w Eliquis 2.5mg bid., c/w amiodarone 100mg daily, check TSH. #4 HTN- c/w amlodipine 10mg, hydralazine 50mg now 3x day #5 HLD- c/w rosuvastatin 40mg #6 DM- Mediterranean diet, c/w Januvia #6 CAD- c/w aspirin, c/w furosemide 80mg daily #7 Breast cancer- s/p radiation, no recurrence #8 CKD- f/u Dr. Tepadino, Renvela, c/w Epogen, not holding hydralazine and amlodipine prior to dialysis #9 Vascular- s/p AV fistula on right, JUAN A stenosis, f/u Dr. Haddad #10 Ortho- s/p carpal tunnel release  [EKG obtained to assist in diagnosis and management of assessed problem(s)] : EKG obtained to assist in diagnosis and management of assessed problem(s)

## 2025-05-16 NOTE — HISTORY OF PRESENT ILLNESS
[FreeTextEntry1] : 86-year-old female DM, HTN, HLD, SVT, CAD, A-fib, breast cancer s/p radiation, ESRD with low diastolic BP. She is feeling alot better as far as BP control. Still c/o fatigue.

## 2025-05-29 NOTE — DISCUSSION/SUMMARY
[FreeTextEntry1] : The patient is an 86-year-old female DM, HTN, HLD, SVT, CAD, A-fib, breast cancer s/p radiation, ESRD with fatigue worse since adding levothyroxine.  #1 CV- prior Cx stent, last cath 2019 mod disease, c/w isosorbide 10/10/20mg, no angina #2 AS- s/p TAVR. c/w aspirin 81mg, ECHO severe LVH and Grade III diastolic dysfxn, TAVR #3 A-Fib- c/w Eliquis 2.5mg bid., c/w amiodarone 100mg daily, event monitor for new bradycardia. #4 HTN- c/w amlodipine 10mg, hydralazine 50mg now 3x day, cut carvedilol to 6.25mg #5 HLD- c/w rosuvastatin 40mg #6 DM- Mediterranean diet, c/w Januvia #6 CAD- c/w aspirin, c/w furosemide 80mg daily #7 Breast cancer- s/p radiation, no recurrence #8 CKD- f/u Dr. Tepadino, Renvela, c/w Epogen, not holding hydralazine and amlodipine prior to dialysis #9 Vascular- s/p AV fistula on right, JUA NA stenosis, f/u Dr. Haddad #10 Ortho- s/p carpal tunnel release  #11 Thyroid- c/w levothyroxine 25 mcg first thing in AM.  [EKG obtained to assist in diagnosis and management of assessed problem(s)] : EKG obtained to assist in diagnosis and management of assessed problem(s)

## 2025-06-10 NOTE — PHYSICAL EXAM
[No Acute Distress] : no acute distress [Well Nourished] : well nourished [Well Developed] : well developed [Well-Appearing] : well-appearing [Normal Sclera/Conjunctiva] : normal sclera/conjunctiva [PERRL] : pupils equal round and reactive to light [EOMI] : extraocular movements intact [Normal Oropharynx] : the oropharynx was normal [Normal Outer Ear/Nose] : the outer ears and nose were normal in appearance [Normal TMs] : both tympanic membranes were normal [No Lymphadenopathy] : no lymphadenopathy [No JVD] : no jugular venous distention [Supple] : supple [Thyroid Normal, No Nodules] : the thyroid was normal and there were no nodules present [No Respiratory Distress] : no respiratory distress  [No Accessory Muscle Use] : no accessory muscle use [Normal Rate] : normal rate  [Clear to Auscultation] : lungs were clear to auscultation bilaterally [Regular Rhythm] : with a regular rhythm [No Murmur] : no murmur heard [Normal S1, S2] : normal S1 and S2 [No Carotid Bruits] : no carotid bruits [Pedal Pulses Present] : the pedal pulses are present [No Edema] : there was no peripheral edema [Soft] : abdomen soft [Non Tender] : non-tender [No Masses] : no abdominal mass palpated [Non-distended] : non-distended [Normal Bowel Sounds] : normal bowel sounds [Normal Posterior Cervical Nodes] : no posterior cervical lymphadenopathy [Normal Anterior Cervical Nodes] : no anterior cervical lymphadenopathy [No CVA Tenderness] : no CVA  tenderness [No Spinal Tenderness] : no spinal tenderness [No Joint Swelling] : no joint swelling [Grossly Normal Strength/Tone] : grossly normal strength/tone [No Rash] : no rash [No Focal Deficits] : no focal deficits [Normal Gait] : normal gait [Normal Affect] : the affect was normal [Normal Insight/Judgement] : insight and judgment were intact [de-identified] : + RUE fistula/ + bruit

## 2025-06-10 NOTE — PLAN
[FreeTextEntry1] : Annual Wellness Visit   Hypothyroidism cont. levothyroxine 25mcg QD we'll check TSH/T4 today  CAD/ HTN/ A.fib/ CHF / HLD cont Amiodarone 100 mg QD cont Carvedilol 12.5 mg Qam cont [id number] mg TID cont. Hydralazine 50mg TID, take additional 25mg or 50mg if BP is elevated cont. Eliquis 2.5 mg BID cont. ASA 81 mg QD cont. Rosuvastatin 40 mg QD cont. Furosemide 80 mg QD Reinforced the importance of following a low cholesterol/low fat diet. we'll check lipids and LFT's today follow with cardiology  DM II Dietary counseling given, dietary avoidance discussed, diet and exercise reviewed with patient; patient reminded of importance of aerobic exercise, weight control, dietary compliance and regular glucose monitoring. Need low carb diet/ exercise. cont Januvia 25 mg QD.  GERD cont. pantoprazole 40 mg QD Follows with GI   ESRD on HD nephrology follow up  Bloodwork ordered.  Follow up in one week for lab results.

## 2025-06-10 NOTE — ADDENDUM
[FreeTextEntry1] : Documented by Radha Campoverde acting as a scribe for Dr. Stephen. 06/10/2025   All medical record entries made by the scribe were at my, Dr. Stephen, direction and personally dictated by me on 06/10/2025. I have reviewed the chart an agree that the record accurately reflects my personal performance of the history, physical exam, assessment and plan. I have also personally directed, reviewed, and agreed with the chart.

## 2025-06-10 NOTE — HISTORY OF PRESENT ILLNESS
[de-identified] : Ms. BHAARTHI HILL is a 86 year old female with Hx of TN/ AS s/p TAVR, CAD w stent, HLD, DM, A.fib, breast cancer s/p partial mastectomy, RT/ ESRD on HD since 1/2023 RT arm fistula on HD M/W/ FR, dialysis, s/p carpal tunnel surgery on rt hand (1/31/25), and hypothyroidism, presenting for an annual wellness visit.   Pt was started on levothyroxine 3 weeks ago due to elevated TSH and states she is feeling well. She follows regularly with cardiology and reports she recently wore a heart monitor and is awaiting the results.  She goes for dialysis M/W/FR and reports she recently had bloodwork done. She monitors her BP and reports it varies daily.  Pt followed with GI for diarrhea/food poisoning (stool positive for campylobacter) in April, and reports she is feeling better s/p Azithromycin 500mg.   Denies any SOB, CP, abdominal pain, N/V/D, headache, or dizziness and reports compliance with taking her meds daily.

## 2025-06-10 NOTE — HEALTH RISK ASSESSMENT
[Fair] :  ~his/her~ mood as fair [No] : In the past 12 months have you used drugs other than those required for medical reasons? No [Little interest or pleasure doing things] : 1) Little interest or pleasure doing things [Feeling down, depressed, or hopeless] : 2) Feeling down, depressed, or hopeless [0] : 2) Feeling down, depressed, or hopeless: Not at all (0) [PHQ-2 Negative - No further assessment needed] : PHQ-2 Negative - No further assessment needed [None] : Patient does not have any barriers to medication adherence [Yes] : Reviewed medication list for presence of high-risk medications. [Blood Thinners] : blood thinners [Never] : Never [NO] : No [Patient reported mammogram was normal] : Patient reported mammogram was normal [Patient reported bone density results were normal] : Patient reported bone density results were normal [Alone] : lives alone [Retired] : retired [Less Than High School] : less than high school [] :  [# Of Children ___] : has [unfilled] children [Feels Safe at Home] : Feels safe at home [No falls in past year] : Patient reported no falls in the past year [Assistive Device] : Patient uses an assistive device [Independent] : managing medications [Some assistance needed] : managing finances [Full assistance needed] : using transportation [Reports normal functional visual acuity (ie: able to read med bottle)] : Reports normal functional visual acuity [Carbon Monoxide Detector] : carbon monoxide detector [Smoke Detector] : smoke detector [Sunscreen] : uses sunscreen [Seat Belt] :  uses seat belt [Designated Healthcare Proxy] : Designated healthcare proxy [Name: ___] : Health Care Proxy's Name: [unfilled]  [Relationship: ___] : Relationship: [unfilled] [de-identified] : Maintains active by using a stationary bike. [de-identified] : Maintains a healthy diet.  [de-identified] : Cane or walker as needed.  [[id number]] : 0 [Reports changes in hearing] : Reports no changes in hearing [Reports changes in vision] : Reports no changes in vision [Reports changes in dental health] : Reports no changes in dental health [Travel to Developing Areas] : does not  travel to developing areas [TB Exposure] : is not being exposed to tuberculosis [Caregiver Concerns] : does not have caregiver concerns [MammogramDate] : 11/2024 [BoneDensityDate] : 09/2023 [ColonoscopyDate] : 03/2019 [ColonoscopyComments] : Diverticulosis and internal hemmorrhoids.   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 of right and left eye cataract surgery about seven years ago. Follows with an ophthalmologist. Wears prescription reading glasses. [de-identified] : Follows with a dentist.